# Patient Record
Sex: FEMALE | Race: WHITE | NOT HISPANIC OR LATINO | URBAN - METROPOLITAN AREA
[De-identification: names, ages, dates, MRNs, and addresses within clinical notes are randomized per-mention and may not be internally consistent; named-entity substitution may affect disease eponyms.]

---

## 2022-06-30 ENCOUNTER — APPOINTMENT (RX ONLY)
Dept: URBAN - METROPOLITAN AREA CLINIC 47 | Facility: CLINIC | Age: 22
Setting detail: DERMATOLOGY
End: 2022-06-30

## 2022-06-30 DIAGNOSIS — L30.9 DERMATITIS, UNSPECIFIED: ICD-10-CM

## 2022-06-30 PROCEDURE — 99204 OFFICE O/P NEW MOD 45 MIN: CPT

## 2022-06-30 PROCEDURE — ? FULL BODY SKIN EXAM - DECLINED

## 2022-06-30 PROCEDURE — ? ORDER TESTS

## 2022-06-30 PROCEDURE — ? COUNSELING

## 2022-06-30 PROCEDURE — ? PRESCRIPTION

## 2022-06-30 RX ORDER — KETOCONAZOLE 20 MG/G
CREAM TOPICAL
Qty: 120 | Refills: 3 | Status: ERX | COMMUNITY
Start: 2022-06-30

## 2022-06-30 RX ORDER — CLINDAMYCIN PHOSPHATE 10 MG/ML
LOTION TOPICAL
Qty: 60 | Refills: 0 | Status: ERX | COMMUNITY
Start: 2022-06-30

## 2022-06-30 RX ORDER — MINOCYCLINE HYDROCHLORIDE 100 MG/1
CAPSULE ORAL
Qty: 30 | Refills: 0 | Status: ERX | COMMUNITY
Start: 2022-06-30

## 2022-06-30 RX ADMIN — KETOCONAZOLE: 20 CREAM TOPICAL at 00:00

## 2022-06-30 RX ADMIN — MINOCYCLINE HYDROCHLORIDE: 100 CAPSULE ORAL at 00:00

## 2022-06-30 RX ADMIN — CLINDAMYCIN PHOSPHATE: 10 LOTION TOPICAL at 00:00

## 2022-06-30 ASSESSMENT — LOCATION DETAILED DESCRIPTION DERM: LOCATION DETAILED: PHILTRUM

## 2022-06-30 ASSESSMENT — LOCATION SIMPLE DESCRIPTION DERM: LOCATION SIMPLE: UPPER LIP

## 2022-06-30 ASSESSMENT — LOCATION ZONE DERM: LOCATION ZONE: LIP

## 2022-06-30 NOTE — PROCEDURE: ORDER TESTS
Expected Date Of Service: 06/30/2022
Billing Type: Third-Party Bill
Performing Laboratory: [id number]
Bill For Surgical Tray: no